# Patient Record
Sex: MALE | Race: WHITE | NOT HISPANIC OR LATINO | URBAN - METROPOLITAN AREA
[De-identification: names, ages, dates, MRNs, and addresses within clinical notes are randomized per-mention and may not be internally consistent; named-entity substitution may affect disease eponyms.]

---

## 2018-02-19 ENCOUNTER — COMMUNICATION - RIVER FALLS (OUTPATIENT)
Dept: FAMILY MEDICINE | Facility: CLINIC | Age: 53
End: 2018-02-19

## 2018-10-08 ENCOUNTER — OFFICE VISIT - RIVER FALLS (OUTPATIENT)
Dept: FAMILY MEDICINE | Facility: CLINIC | Age: 53
End: 2018-10-08

## 2018-10-08 ASSESSMENT — MIFFLIN-ST. JEOR: SCORE: 2128.55

## 2020-02-20 ENCOUNTER — AMBULATORY - RIVER FALLS (OUTPATIENT)
Dept: FAMILY MEDICINE | Facility: CLINIC | Age: 55
End: 2020-02-20

## 2022-02-12 VITALS
HEART RATE: 67 BPM | WEIGHT: 269.8 LBS | DIASTOLIC BLOOD PRESSURE: 84 MMHG | SYSTOLIC BLOOD PRESSURE: 136 MMHG | OXYGEN SATURATION: 99 % | BODY MASS INDEX: 34.63 KG/M2 | HEIGHT: 74 IN

## 2022-02-15 NOTE — PROGRESS NOTES
Patient:   LEANDRO SALAS            MRN: [id number]            FIN: [id number]               Age:   52 years     Sex:  Male     :  1965   Associated Diagnoses:   Left knee pain   Author:   Errol Wall MD      Impression and Plan   Diagnosis     Left knee pain (YAC53-TC M25.562).     Differential: Acute Gout, Acute flare of OA, Lyme Disease, Viral Synovitis, Bacterial Synovitis.     Course:  Worsening.    Plan:  call tomorrow with progress report.  Will refer to Ortho if getting worse.    Orders     Orders (Selected)   Outpatient Orders  Ordered (Dispatched)  CBC (h/h, RBC, indices, WBC, Plt)* ([facility]): Specimen Type: Blood, Collection Date: 10/08/18 14:13:00 CDT  Lyme disease antibody, total, eia with reflex to western blot (igg,igm)* ([facility]): Specimen Type: Serum, Collection Date: 10/08/18 14:13:00 CDT  Sed rate by modified westergren* ([facility]): Specimen Type: Blood, Collection Date: 10/08/18 14:13:00 CDT  Uric Acid* ([facility]): Specimen Type: Serum, Collection Date: 10/08/18 14:13:00 CDT  Prescriptions  Prescribed  indomethacin 50 mg oral capsule: = 1 cap(s) ( 50 mg ), PO, TID, PRN: for gout pain, # 30 cap(s), 0 Refill(s), Type: Maintenance, Pharmacy: [facility], 1 cap(s) Oral tid,PRN:for gout pain.        Visit Information      Date of Service: 10/08/2018 01:32 pm  Performing Location: [facility]  Encounter#: [id number]      Primary Care Provider (PCP):  RF -UNKNOWN, PERSONNEL   Visit type:  New symptom.    Accompanied by:  No one.    Source of history:  Self.    History limitation:  None.       Chief Complaint   10/8/2018 1:43 PM CDT    Pt here for left knee pain NKI        History of Present Illness             The patient presents with a knee problem.  The location of the knee problem is localized, the left knee, anterior aspect.  The knee problem is characterized by aching.  The severity of the knee problem is moderate.  The timing/course of symptom(s) associated with the knee  problem is constant.  The knee problem has lasted for 1 day(s).  Radiation of pain: none.  The context of the knee problem: occurred unknown cause.  Relieving factors consist of analgesics, cold application and rest.  Associated symptoms consist of none and No redness or swelling.        Review of Systems   Constitutional:  Negative.    Eye:  Negative.    Ear/Nose/Mouth/Throat:  Negative.    Respiratory:  Negative.    Cardiovascular:  Negative.    Gastrointestinal:  Negative.    Genitourinary:  Negative.    Hematology/Lymphatics:  Negative.    Endocrine:  Negative.    Immunologic:  Negative.    Musculoskeletal:  Negative except as documented in history of present illness.    Integumentary:  Negative.    Neurologic:  Negative.    Psychiatric:  Negative.    All other systems reviewed and negative      Health Status   Allergies:    Allergic Reactions (Selected)  No known allergies  No Known Medication Allergies   Medications:  (Selected)   Prescriptions  Prescribed  indomethacin 50 mg oral capsule: = 1 cap(s) ( 50 mg ), PO, TID, PRN: for gout pain, # 30 cap(s), 0 Refill(s), Type: Maintenance, Pharmacy: [facility], 1 cap(s) Oral tid,PRN:for gout pain   Problem list:    All Problems  Obesity / SNOMED CT [id number] / Probable      Histories   Past Medical History:    No active or resolved past medical history items have been selected or recorded.   Family History:    No family history items have been selected or recorded.   Procedure history:    No active procedure history items have been selected or recorded.   Social History:             No active social history items have been recorded.      Physical Examination   Vital Signs   10/8/2018 1:43 PM CDT Peripheral Pulse Rate 67 bpm    Systolic Blood Pressure 136 mmHg  HI    Diastolic Blood Pressure 84 mmHg  HI    Mean Arterial Pressure 101 mmHg    Oxygen Saturation 99 %      Measurements from flowsheet : Measurements   10/8/2018 1:43 PM CDT Height Measured -  Standard 74 in    Weight Measured - Standard 269.8 lb    BSA 2.53 m2    Body Mass Index 34.64 kg/m2  HI      General:  Alert and oriented X 3, No acute distress, Warm, Pink, Intact, No skin lesions.         Appearance: Within normal limits, Well nourished, Calm.         Hydration: Within normal limits.         Psych: Within normal limits, Appropriate mood and affect, Cooperative, Normal judgment.    Musculoskeletal:       Lower extremity exam: Knee ( Left, No deformity, No erythema, No ecchymosis, No effusion, No mass, No nodule, No swelling, No tenderness, No wound, No crepitus, No numbness, Strength  5 /5, Normal range of motion ).